# Patient Record
Sex: FEMALE | Race: WHITE | NOT HISPANIC OR LATINO | Employment: FULL TIME | URBAN - METROPOLITAN AREA
[De-identification: names, ages, dates, MRNs, and addresses within clinical notes are randomized per-mention and may not be internally consistent; named-entity substitution may affect disease eponyms.]

---

## 2024-03-18 ENCOUNTER — OFFICE VISIT (OUTPATIENT)
Dept: PODIATRY | Facility: CLINIC | Age: 54
End: 2024-03-18
Payer: COMMERCIAL

## 2024-03-18 VITALS — SYSTOLIC BLOOD PRESSURE: 132 MMHG | DIASTOLIC BLOOD PRESSURE: 76 MMHG | HEART RATE: 84 BPM

## 2024-03-18 DIAGNOSIS — M20.21 HALLUX RIGIDUS OF RIGHT FOOT: Primary | ICD-10-CM

## 2024-03-18 PROCEDURE — 99204 OFFICE O/P NEW MOD 45 MIN: CPT | Performed by: PODIATRIST

## 2024-03-18 RX ORDER — HYDROXYZINE HYDROCHLORIDE 25 MG/1
25 TABLET, FILM COATED ORAL EVERY 6 HOURS PRN
COMMUNITY
Start: 2023-11-06

## 2024-03-18 RX ORDER — CYCLOBENZAPRINE HCL 10 MG
10 TABLET ORAL DAILY PRN
COMMUNITY
Start: 2024-03-04 | End: 2024-04-03

## 2024-03-18 RX ORDER — AZELAIC ACID 0.15 G/G
GEL TOPICAL
COMMUNITY
Start: 2024-02-26

## 2024-03-18 RX ORDER — ALPRAZOLAM 0.5 MG/1
0.5 TABLET ORAL EVERY 8 HOURS PRN
COMMUNITY
Start: 2023-09-22 | End: 2024-03-20

## 2024-03-18 RX ORDER — LOSARTAN POTASSIUM 25 MG/1
TABLET ORAL
COMMUNITY
Start: 2019-03-14

## 2024-03-18 NOTE — PROGRESS NOTES
"Assessment/Plan:         Diagnoses and all orders for this visit:    Hallux rigidus of right foot    Other orders  -     Multiple Vitamins-Minerals (Multivitamin Adult, Minerals,) TABS; Take by mouth  -     hydrOXYzine HCL (ATARAX) 25 mg tablet; Take 25 mg by mouth every 6 (six) hours as needed  -     metroNIDAZOLE (METROCREAM) 0.75 % cream; APPLY AT NIGHT TO FACE  -     ALPRAZolam (XANAX) 0.5 mg tablet; Take 0.5 mg by mouth every 8 (eight) hours as needed  -     Azelaic Acid 15 % cream; APPLY TOPICALLY TO FACE ONCE DAILY IN THE MORNING  -     cyclobenzaprine (FLEXERIL) 10 mg tablet; Take 10 mg by mouth daily as needed  -     losartan (COZAAR) 25 mg tablet      Diagnosis and options discussed with patient  Patient agreeable to the plan as stated below  Xr reviewed with patient.     PAtient is scheduled for cheilectomy procedure on her right great toe. After reviewing her imaging and clinical exam, her option would be cheilectomy vas fusion. I discussed the risks and benefits of both. We discussed the difference in recovery as well. I reviewed her orthotics. We spent 45 minutes discussing her previous treatments and review of surgical options. She was very appreciative of my input. She will likely proceed with her surgeon as planned    Subjective:      Patient ID: Sabine Dukes is a 53 y.o. female.    Patient has chronic callus that she treated with Compound W and it was \"dug out.\" She then got orthotics with a cut out which she is showing Dr. Sullivan. She has had a lot fo XRays. She gets a lot of arthritic pain in the big toe. It is getting more painful. Her surgeon recommended a cheilectomy procedure and she is looking for a second opinion.         The following portions of the patient's history were reviewed and updated as appropriate: allergies, current medications, past family history, past medical history, past social history, past surgical history, and problem list.    Review of Systems    As stated in " HPI, otherwise normal      Objective:      /76 (BP Location: Left arm, Patient Position: Sitting, Cuff Size: Standard)   Pulse 84          Physical Exam  Vitals reviewed.   Constitutional:       Appearance: She is not ill-appearing or diaphoretic.   Cardiovascular:      Rate and Rhythm: Normal rate.      Pulses: Normal pulses.           Dorsalis pedis pulses are 2+ on the right side.        Posterior tibial pulses are 2+ on the right side.   Pulmonary:      Effort: Pulmonary effort is normal. No respiratory distress.   Musculoskeletal:      Right foot: Decreased range of motion (severe hallux rigidus with spurring. Less than 5 degrees ROM with crepitus). Deformity present.   Skin:     Capillary Refill: Capillary refill takes less than 2 seconds.      Findings: No erythema or rash.   Neurological:      Mental Status: She is alert and oriented to person, place, and time.      Sensory: No sensory deficit.      Gait: Gait normal.   Psychiatric:         Mood and Affect: Mood normal.       XRay 3 views of the right personally read by Dr. Sullivan in office today and discussed with patient:    There is no acute fracture or dislocation.  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  No lytic or blastic osseous lesion.  Soft tissues are unremarkable.